# Patient Record
(demographics unavailable — no encounter records)

---

## 2025-05-14 NOTE — PLAN
[FreeTextEntry1] : extensive blood panel ordered and started hormone replacement Discussed with the patient the risks and benefits of hormone replacement including P.E and Thrombosis and CVA and MI.  rto 6 weeks

## 2025-05-14 NOTE — HISTORY OF PRESENT ILLNESS
[FreeTextEntry1] : Pt in office for annual exam. . Patient had hysteroscopy and D and C normal. complains of vasomotor symptoms and also body aches and weight gain. Patient saw endocrinologist and was told she has elevated cortisol. no vaginal bleeding